# Patient Record
Sex: FEMALE | Race: WHITE | NOT HISPANIC OR LATINO | Employment: OTHER | URBAN - NONMETROPOLITAN AREA
[De-identification: names, ages, dates, MRNs, and addresses within clinical notes are randomized per-mention and may not be internally consistent; named-entity substitution may affect disease eponyms.]

---

## 2017-01-05 DIAGNOSIS — R91.1 LUNG NODULE: Primary | ICD-10-CM

## 2017-02-02 ENCOUNTER — OFFICE VISIT (OUTPATIENT)
Dept: CARDIAC SURGERY | Facility: CLINIC | Age: 72
End: 2017-02-02

## 2017-02-02 DIAGNOSIS — R91.1 LUNG NODULE: Primary | ICD-10-CM

## 2017-02-02 PROCEDURE — 99212 OFFICE O/P EST SF 10 MIN: CPT | Performed by: THORACIC SURGERY (CARDIOTHORACIC VASCULAR SURGERY)

## 2017-02-03 VITALS
HEIGHT: 66 IN | HEART RATE: 95 BPM | BODY MASS INDEX: 26.2 KG/M2 | WEIGHT: 163 LBS | DIASTOLIC BLOOD PRESSURE: 74 MMHG | SYSTOLIC BLOOD PRESSURE: 138 MMHG

## 2017-02-03 RX ORDER — RANITIDINE 150 MG/1
TABLET ORAL
Refills: 5 | COMMUNITY
Start: 2017-01-10

## 2017-02-03 RX ORDER — FERROUS SULFATE 325(65) MG
325 TABLET ORAL
COMMUNITY

## 2017-02-03 NOTE — PROGRESS NOTES
02/02/2017  Patient Information  Rita Hernández                                                                                          [address]  [place] [address]   1945  'PCP/Referring Physician'  Blane Rain, DO  [phone]  No ref. provider found    Chief Complaint   Patient presents with   • Lung Nodule     4 month follow-up with results from CT chest       History of Present Illness:  Mrs. Hernández returns today for follow up of her lung nodule.  She has been doing reasonably well over the last few months.  She has had some flu symptoms.  She denies any hemoptysis, weight loss or major other problems.    Patient Active Problem List   Diagnosis   • Lung nodule     Past Medical History   Diagnosis Date   • Arthritis    • Breast cancer    • Diabetes    • Dyslipidemia    • GERD (gastroesophageal reflux disease)    • Hiatal hernia    • Iron deficiency    • Lung nodule    • Macular degeneration    • Ovarian cancer    • Tachycardia      Past Surgical History   Procedure Laterality Date   • Tonsillectomy     • Hysterectomy     • Breast lumpectomy Left        Current Outpatient Prescriptions:   •  Acetaminophen (TYLENOL ARTHRITIS PAIN PO), Take  by mouth., Disp: , Rfl:   •  atenolol (TENORMIN) 25 MG tablet, , Disp: , Rfl:   •  Calcium Carbonate-Vit D-Min (CALTRATE PLUS PO), Take  by mouth., Disp: , Rfl:   •  cetirizine (ZyrTEC) 10 MG tablet, Take 10 mg by mouth daily., Disp: , Rfl:   •  CINNAMON PO, Take  by mouth., Disp: , Rfl:   •  citalopram (CeleXA) 20 MG tablet, , Disp: , Rfl:   •  ferrous sulfate 325 (65 FE) MG tablet, Take 325 mg by mouth Daily With Breakfast., Disp: , Rfl:   •  GARLIC PO, Take  by mouth., Disp: , Rfl:   •  meclizine (ANTIVERT) 25 MG tablet, , Disp: , Rfl:   •  Multiple Vitamins-Minerals (EYE VITAMINS) capsule, Take  by mouth., Disp: , Rfl:   •  Multiple Vitamins-Minerals (MULTIVITAMIN ADULT PO), Take  by mouth., Disp: , Rfl:   •  oxybutynin (DITROPAN) 5 MG tablet, ,  Disp: , Rfl:   •  raNITIdine (ZANTAC) 150 MG tablet, , Disp: , Rfl: 5  •  simvastatin (ZOCOR) 40 MG tablet, , Disp: , Rfl:   •  temazepam (RESTORIL) 15 MG capsule, Take 15 mg by mouth at night as needed for sleep., Disp: , Rfl:   •  traMADol (ULTRAM) 50 MG tablet, , Disp: , Rfl:   •  Cyanocobalamin (VITAMIN B12 PO), Take  by mouth., Disp: , Rfl:   •  pantoprazole (PROTONIX) 40 MG EC tablet, Take 40 mg by mouth daily., Disp: , Rfl:   •  SOY ISOFLAVONE PO, Take  by mouth., Disp: , Rfl:   Allergies   Allergen Reactions   • Sulfa Antibiotics      Social History     Social History   • Marital status:      Spouse name: N/A   • Number of children: N/A   • Years of education: N/A     Occupational History   • Housewife      Social History Main Topics   • Smoking status: Never Smoker   • Smokeless tobacco: Never Used   • Alcohol use No   • Drug use: No   • Sexual activity: Not on file     Other Topics Concern   • Not on file     Social History Narrative     Family History   Problem Relation Age of Onset   • Heart attack Mother    • Aneurysm Father      Review of Systems   Constitution: Positive for malaise/fatigue and weight loss. Negative for chills, fever and night sweats.   HENT: Positive for hearing loss. Negative for headaches, odynophagia and sore throat.    Eyes: Positive for blurred vision.   Cardiovascular: Positive for dyspnea on exertion and palpitations. Negative for chest pain, leg swelling and orthopnea.   Respiratory: Negative for cough and hemoptysis.    Endocrine: Negative for cold intolerance, heat intolerance, polydipsia, polyphagia and polyuria.   Hematologic/Lymphatic: Does not bruise/bleed easily.   Skin: Negative for itching and rash.   Musculoskeletal: Positive for back pain, joint pain and joint swelling. Negative for myalgias.   Gastrointestinal: Positive for abdominal pain, diarrhea, dysphagia, nausea and vomiting. Negative for constipation, hematemesis, hematochezia and melena.  "  Genitourinary: Negative for dysuria, frequency and hematuria.   Neurological: Positive for dizziness, light-headedness and vertigo. Negative for focal weakness, numbness and seizures.   Psychiatric/Behavioral: Negative for suicidal ideas.   Allergic/Immunologic: Positive for environmental allergies.   All other systems reviewed and are negative.    Vitals:    02/03/17 0851   BP: 138/74   BP Location: Right arm   Patient Position: Sitting   Pulse: 95   Weight: 163 lb (73.9 kg)   Height: 66\" (167.6 cm)      Physical Exam  NECK:    Without masses.  LUNGS:   Clear.  CARDIOVASCULAR:  Regular rhythm and rate.    Labs/Imaging:  CT scan reveals no worrisome nodules.  She does have some small nodules.    Assessment/Plan:  We will follow up with her in a year with another chest CT.      Patient Active Problem List   Diagnosis   • Lung nodule     Signed by: TY Bowman transcribing on behalf of ELMA Bowman dictating    CC:  Blane Rain, DO   "

## 2018-01-31 ENCOUNTER — TELEPHONE (OUTPATIENT)
Dept: CARDIAC SURGERY | Facility: CLINIC | Age: 73
End: 2018-01-31

## 2018-01-31 NOTE — TELEPHONE ENCOUNTER
PT CALLING TO SCHEDULE F/U IN [place]. PT STATES SHE WILL NOT BE ABLE TO COME ON THE FOLLOWING DATES IN FEB. 12TH, 13TH, 20TH, AND 27TH.

## 2018-02-01 DIAGNOSIS — R91.8 MULTIPLE LUNG NODULES: Primary | ICD-10-CM

## 2018-02-02 DIAGNOSIS — R91.8 LUNG NODULE, MULTIPLE: Primary | ICD-10-CM

## 2019-04-02 ENCOUNTER — TELEPHONE (OUTPATIENT)
Dept: CARDIAC SURGERY | Facility: CLINIC | Age: 74
End: 2019-04-02

## 2019-04-02 NOTE — TELEPHONE ENCOUNTER
Pt has received recall letter per [facility] in [place] for 1 yr f/u with a CT (per nurse note on 4/30/18) pt stated that she already had a PET scan recently @ [facility], she is asking if she needs to see [facility] in [place] since she sees a cancer MD named Dr. Deann Starr, if she does pt verified demo

## 2019-04-09 ENCOUNTER — TELEPHONE (OUTPATIENT)
Dept: CARDIAC SURGERY | Facility: CLINIC | Age: 74
End: 2019-04-09

## 2019-04-09 NOTE — TELEPHONE ENCOUNTER
PT RETURNING MAN'S CALL. PT DOESN'T WISH TO COME FOR RECALL. SHE IS BEING FOLLOWED BY ANOTHER PHYSICIAN FOR [place]. PT STATED THAT IF SHE EVER NEEDED SURGERY SHE WOULD CALL DR. CRAMER.